# Patient Record
Sex: MALE | Race: WHITE
[De-identification: names, ages, dates, MRNs, and addresses within clinical notes are randomized per-mention and may not be internally consistent; named-entity substitution may affect disease eponyms.]

---

## 2018-06-01 ENCOUNTER — HOSPITAL ENCOUNTER (INPATIENT)
Dept: HOSPITAL 59 - MEDSURG | Age: 63
LOS: 4 days | Discharge: HOME HEALTH | DRG: 470 | End: 2018-06-05
Attending: ORTHOPAEDIC SURGERY | Admitting: ORTHOPAEDIC SURGERY
Payer: COMMERCIAL

## 2018-06-01 DIAGNOSIS — Z79.84: ICD-10-CM

## 2018-06-01 DIAGNOSIS — M10.9: ICD-10-CM

## 2018-06-01 DIAGNOSIS — E11.9: ICD-10-CM

## 2018-06-01 DIAGNOSIS — M16.11: Primary | ICD-10-CM

## 2018-06-01 DIAGNOSIS — I10: ICD-10-CM

## 2018-06-01 PROCEDURE — 85014 HEMATOCRIT: CPT

## 2018-06-01 PROCEDURE — 97530 THERAPEUTIC ACTIVITIES: CPT

## 2018-06-01 PROCEDURE — 82948 REAGENT STRIP/BLOOD GLUCOSE: CPT

## 2018-06-01 PROCEDURE — 97110 THERAPEUTIC EXERCISES: CPT

## 2018-06-01 PROCEDURE — 36416 COLLJ CAPILLARY BLOOD SPEC: CPT

## 2018-06-01 PROCEDURE — 85018 HEMOGLOBIN: CPT

## 2018-06-01 PROCEDURE — 86901 BLOOD TYPING SEROLOGIC RH(D): CPT

## 2018-06-01 PROCEDURE — 86900 BLOOD TYPING SEROLOGIC ABO: CPT

## 2018-06-01 PROCEDURE — 86850 RBC ANTIBODY SCREEN: CPT

## 2018-06-04 LAB
ABO GROUP: (no result)
ANTIBODY SCREEN: NEGATIVE
RH TYPE: NEGATIVE

## 2018-06-04 PROCEDURE — 0SR906A REPLACEMENT OF RIGHT HIP JOINT WITH OXIDIZED ZIRCONIUM ON POLYETHYLENE SYNTHETIC SUBSTITUTE, UNCEMENTED, OPEN APPROACH: ICD-10-PCS | Performed by: ORTHOPAEDIC SURGERY

## 2018-06-04 RX ADMIN — DOCUSATE SODIUM SCH: 100 TABLET, FILM COATED ORAL at 09:59

## 2018-06-04 RX ADMIN — FERROUS SULFATE TAB 325 MG (65 MG ELEMENTAL FE) SCH: 325 (65 FE) TAB at 18:32

## 2018-06-04 RX ADMIN — VANCOMYCIN HYDROCHLORIDE SCH MLS/60 MIN: 1 INJECTION, POWDER, LYOPHILIZED, FOR SOLUTION INTRAVENOUS at 22:14

## 2018-06-04 RX ADMIN — HYDROCODONE BITARTRATE AND ACETAMINOPHEN PRN EACH: 7.5; 325 TABLET ORAL at 16:43

## 2018-06-04 RX ADMIN — FERROUS SULFATE TAB 325 MG (65 MG ELEMENTAL FE) SCH MG: 325 (65 FE) TAB at 22:07

## 2018-06-04 RX ADMIN — DOCUSATE SODIUM SCH MG: 100 TABLET, FILM COATED ORAL at 22:09

## 2018-06-04 RX ADMIN — HYDROCODONE BITARTRATE AND ACETAMINOPHEN PRN EACH: 7.5; 325 TABLET ORAL at 22:13

## 2018-06-04 NOTE — REHAB EVALUATION
Patient Information





- Patient Information


Diagnosis: R hip OA.


Ordered Treatment: PT Evaluate and Treat


Status: Initial Evaluation


Surgery: Yes (THR)


Date of Surgery: 06/04/18


Past Medical/Surgical Hx: 


 PAST MEDICAL/SURGICAL HISTORY





Past Surgical History            tonsils


                                 kidney stone





PMH - Respiratory





Hx Respiratory Disorders         Yes


Hx Bronchitis                    Yes: in past


Hx Pneumonia                     Yes: 8 yrs ago possibly





PMH - Cardiovascular





Hx Cardiovascular Disorders      Yes


Hx Hypertension                  Yes: on meds good control


Exercise Tolerance               Fair


Hx Transient Ischemic Attacks    Yes: possibly 4 years ago decreased peripheral


(TIA)                            vision


Comment:                         due to hip pain





PMH - Neuro





Hx Neurological Disorders        Yes


Hx Transient Ischemic Attacks    Yes: possibly 4 years ago decreased peripheral


(TIA)                            vision





PMH - GI





Hx Gastrointestinal Disorders    No





PMH - 





Hx Genitourinary Disorders       Yes


Hx Kidney Stones                 Yes





PMH - Endocrine





Hx Endocrine Disorders           Yes


Hx Diabetes                      Yes: dx'd early 1990's


Hx Thyroid Disease               Yes


Hx of IDDM                       Yes


Comment:                          this am





PMH - Musculoskeletal





Hx Musculoskeletal Disorders     Yes


Hx Arthritis                     Yes: right hip


Hx Gout                          Yes





PMH - Psych





Hx Psychiatric Problems          No





PMH - Hematology/Oncology





Hx Hematology/Oncology           No


Disorders                        


Comment:                         nose bleed while on ASA








Premorbid Status: Detail (The patient was previously independent with all 

mobility prior to surgery.)


Social History: Detail (The patient lives alone in a 2 story house with a ramp 

at front enterance and 2 steps with one railing at the back enterance. The 

patient will be living on the first floor primarily. The patient's bathroom is 

equipped wtih a tub/shower combination with a shower bench, standard toilet 

seat with a riser seat. The patient has a walker with wheels, wheelchair, 

reacher, long handled sponge and shoe horn.)


Precautions: Universal, Fall, Other (THR precautions.)





- Time With Patient


Total Time Spent With Patient (Min): 30


Treatment Procedures: Detail (Initial Evaluation)





Subjective Information





- Subjective Information


Per Patient (The patient had no complaints of pain.)





Objective Data





- Mental Status


Patient Orientation: Oriented x3





- Visual Perception


Appears within normal limits for therapeutic activities





- ROM


Not within normal limits (The patient's R hip is within total hip precautions. 

All other LE hip AROM is WNL.)





- Strength/Tone


Not within normal limits (The patient's R LE strength was not tested secondary 

to s/p surgery, however patient's strength is functional ie: patient is able to 

lift LE out of bed. The patient's R LE strength is generally 4+ to 5/5.)





- Bed Mobility


Independent (The patient was independent with supine to sit and scooting up in 

bed correctly following THR precautions.)





- Transfers


Independent (The patient was independent with sit to and from stand transfer.)





- Balance


Balance Sitting: Good


Balance Standing: Good





- Sensation


Intact





- Gait


Detail (The patient ambulated 48 feet x 1 with wheeled walker, WBAT on the R LE 

with CG for safety due to occasional unsteadiness with ambulation.)





Therapy Assessment





- Therapy Assessment


Detail (The patient was independent with bed mobilty ,transfers and required CG 

for safety with ambulation. Feel the patient will progress well with mobility.)





Patient Education





- Patient Education


Teaching Topic: Exercise/Activity (The patient completed the following THR 

exercises: gluteal sets, ankle pumps, hip abduction supine, heel slides, quad 

sets, hamstring sets.), Precautions (The patient was able to verbalize THR 

precautions with occasional verbal reminders.)


Response: Return Demonstration


Teaching Method: Discussion, Handout


Teaching Recipient: Patient


Barriers To Learning: None





Problem List





- Problem List


Physical Therapy Problem List: Detail (1) Decreased R LE strength as to be 

expected following surgery. 2) Non ambulatory on stairs. 3) CG with ambulation 

due to unsteadiness.)





Goals





- Goals


Physical Therapy Goals: 1) The patient will be independent with ambulation with 

wheeled walker household distance WBAT on the R LE.  2) The patient will 

ambulate on stairs with supervision of safety.  3) The patient will 

consistently remember and follow THR precautions.





Prognosis





- Prognosis


Good





Plan





- Plan


Physical Therapy Plan: PT 1-2 times a day for gait training on levels and 

stairs until all inpatient goas have been met.

## 2018-06-05 LAB
HCT VFR BLD CALC: 33 % (ref 42–52)
HGB BLD-MCNC: 10.6 GM/DL (ref 14–18)

## 2018-06-05 RX ADMIN — DOCUSATE SODIUM SCH MG: 100 TABLET, FILM COATED ORAL at 10:07

## 2018-06-05 RX ADMIN — VANCOMYCIN HYDROCHLORIDE SCH MLS/60 MIN: 1 INJECTION, POWDER, LYOPHILIZED, FOR SOLUTION INTRAVENOUS at 08:30

## 2018-06-05 RX ADMIN — HYDROCODONE BITARTRATE AND ACETAMINOPHEN PRN EACH: 7.5; 325 TABLET ORAL at 07:39

## 2018-06-05 RX ADMIN — FERROUS SULFATE TAB 325 MG (65 MG ELEMENTAL FE) SCH MG: 325 (65 FE) TAB at 10:07

## 2018-06-05 NOTE — REHAB EVALUATION
Patient Information





- Patient Information


Diagnosis: R hip OA.


Ordered Treatment: OT Evaluate and Treat


Status: Initial Evaluation


Surgery: Yes (THR)


Date of Surgery: 06/04/18


Past Medical/Surgical Hx: 


 PAST MEDICAL/SURGICAL HISTORY





Past Surgical History            tonsils


                                 kidney stone





PMH - Respiratory





Hx Respiratory Disorders         Yes


Hx Bronchitis                    Yes: in past


Hx Pneumonia                     Yes: 8 yrs ago possibly





PMH - Cardiovascular





Hx Cardiovascular Disorders      Yes


Hx Hypertension                  Yes: on meds good control


Exercise Tolerance               Fair


Hx Transient Ischemic Attacks    Yes: possibly 4 years ago decreased peripheral


(TIA)                            vision


Comment:                         due to hip pain





PMH - Neuro





Hx Neurological Disorders        Yes


Hx Transient Ischemic Attacks    Yes: possibly 4 years ago decreased peripheral


(TIA)                            vision





PMH - GI





Hx Gastrointestinal Disorders    No





PMH - 





Hx Genitourinary Disorders       Yes


Hx Kidney Stones                 Yes





PMH - Endocrine





Hx Endocrine Disorders           Yes


Hx Diabetes                      Yes: dx'd early 1990's


Hx Thyroid Disease               Yes


Hx of IDDM                       Yes


Comment:                          this am





PMH - Musculoskeletal





Hx Musculoskeletal Disorders     Yes


Hx Arthritis                     Yes: right hip


Hx Gout                          Yes





PMH - Psych





Hx Psychiatric Problems          No





PMH - Hematology/Oncology





Hx Hematology/Oncology           No


Disorders                        


Comment:                         nose bleed while on ASA








Premorbid Status: Detail (The patient was previously independent with all 

mobility and I/ADLs prior to surgery.)


Social History: Detail (The patient lives alone in a 2 story house with a ramp 

at the front enterance and 2 steps with one railing at the back enterance. The 

patient will be living on the first floor primarily. The patient's bathroom is 

equipped with a tub/shower combination with a shower bench (all 4 legs in shower

), standard toilet seat with a riser seat (on the way), and grab bars 

throughout bathroom including in tub/shower. The patient has a 2WW, wheelchair, 

reacher, long handled sponge, and shoe horn.)


Precautions: Universal, Fall, Other (Total Hip precautions.)





- Time With Patient


Total Time Spent With Patient (Min): 25


Treatment Procedures: Detail (Initial Evaluation: Low Complexity, completed by 

OT student Candice Lynn under direct supervision of OTRL. Pt. was left supine 

in bed with call light, cell phone, and bedside table within reach, and 

declined needing anything at this time.)





Subjective Information





- Subjective Information


Per Patient (Pt stated he is right hand dominant, will be receiving home health 

upon returning home, and that his work involves social security for 

disabilities. Pt also reported that he "doesn't own socks".)





Objective Data





- Pain


Pain Present: Yes


Pain Scale Used: Numeric (1 - 10) (1/10 at rest in R hip)





- Mental Status


Patient Orientation: Oriented x3





- Visual Perception


Appears within normal limits for therapeutic activities





- ROM


Within normal limits (BUE AROM all planes WNL)





- Strength/Tone


Within normal limits (MMT- BUE in all planes 5/5 except L shoulder flexion 4+/5)





- Coordination


Appears within normal limits for therapeutic activities





- Bed Mobility


Independent (Supine<>SS EOB Indp. from flat surface and no use of hand rails 

but pt. Ind. initiated modified techniques to adhere to hip precautions.)





- Transfers


Independent (SS EOB<>standing with 2WW Indp. with proper hand placement and 

adherence to hip precautions.)





- Balance


Balance Sitting: Good


Balance Standing: Fair (2WW for support)





- Sensation


Intact (Light touch intact for BUE fingertips on volar surface.)





- ADL's/IADL's


Detail (Pt verbalize understanding of hip precautions without any VC. Educated 

patient in adaptive LB dressing techniques and use of AE for safety and 

independence. Pt demonstrated and verbalized understanding of adaptive LB 

dressing techniques. Pt donned shorts while safely and independently utilizing 

adaptive LB dressing techniques to adhere to hip precautions. Pt donned 

pullover t-shirt Indp. in SS EOB. Pt verbalized understanding on how to omar/

doff shoes with AE (reacher, shoe horn). Pt denied education/training in sock 

aid, as he stated he "doesn't own socks." Educated pt on wearing gripper socks 

or tennis shoes when out of bed in hospital for safety. Pt plans on sponge 

bathing until home health services is able to assist with bathing.)





Therapy Assessment





- Therapy Assessment


Detail (ADL tasks completed safely and indpendently and no longer requires 

inpatient skilled OT services at this time. Pt will be receiving home health 

care upon returning home for intial recovery. Pt has a good support system at 

home, stating "lots of people" and "2 nurses and a therapist" to assist if 

needed. Pt. has hip kit equipment and a good environmental set-up at home.)





Patient Education





- Patient Education


Teaching Topic: Equipment Use, Other (Adaptive LB dressing techniques)


Response: Return Demonstration, Verbalize Understanding


Teaching Method: Discussion, Demonstration


Teaching Recipient: Patient


Barriers To Learning: None





Problem List





- Problem List


Physical Therapy Problem List: Detail (1) Decreased R LE strength as to be 

expected following surgery. 2) Non ambulatory on stairs. 3) CG with ambulation 

due to unsteadiness.)





Goals





- Goals


Physical Therapy Goals: GOALS MET:  1) The patient will be independent with 

ambulation with wheeled walker household distance WBAT on the R LE.  2) The 

patient will ambulate on stairs with supervision of safety.  3) The patient 

will consistently remember and follow THR precautions.





Prognosis





- Prognosis


Good (Pending pt adheres to hip precautions)





Plan





- Plan


Physical Therapy Plan: The patient has met all inpatient PT goals and is 

discharged from inpatient PT. The patient is to receive Home PT.


Occupational Therapy Plan: D/c pt from skilled inpatient OT services at this 

time. Pt reported having no questions or concerns regarding his return home.

## 2018-06-05 NOTE — PHYSICAL THERAPY TX NOTE
Physical Therapy Tx Note





- Treatment Note


Tolerated: Good


Total Time Spent With Patient: 25


Physical Therapy Tx Note: Detail (The patient was in bed when PT arrived.  The 

patient ambulated with 2 wheeled walker independently WBAT on the R LE to 

bathroom and in the hallway 136 feet. The patient ambulated with supervision 

for safety on 3 stairs with one railing and folded walker. The patient 

completed THR exercises: gluteal sets, quad sets, hamstring sets, hip abduction

, heel slides, ankle pumps all x 10 reps.)


Physical Therapy Problem List: Detail (1) Decreased R LE strength as to be 

expected following surgery. 2) Non ambulatory on stairs. 3) CG with ambulation 

due to unsteadiness.)


Physical Therapy Goals: GOALS MET:  1) The patient will be independent with 

ambulation with wheeled walker household distance WBAT on the R LE.  2) The 

patient will ambulate on stairs with supervision of safety.  3) The patient 

will consistently remember and follow THR precautions.


Physical Therapy Plan: The patient has met all inpatient PT goals and is 

discharged from inpatient PT. The patient is to receive Home PT.

## 2018-06-05 NOTE — RADIOLOGY REPORT
EXAM:  RIGHT HIP



HISTORY:  POSTOP RIGHT LUCAS.  



TECHNIQUE:  AP views of the right hip were obtained.  



Comparison:  None.  



FINDINGS:  The patient is postop right LUCAS.  The components appear in good 
position in the frontal projection with no dislocation evident.  Some air is 
seen in the soft tissues which is presumably postoperative in nature.  



IMPRESSION:  

POSTOP RIGHT LUCAS.  THE COMPONENTS APPEAR IN GOOD POSITION IN THE AP PROJECTION.
  



JOB NUMBER:  [id number]
MTDD

## 2018-06-05 NOTE — OPERATIVE NOTE
DATE OF SURGERY:  06/04/2018



PREOPERATIVE DIAGNOSIS:  End-stage right hip arthrosis. 



POSTOPERATIVE DIAGNOSIS:  End-stage right hip arthrosis. 



OPERATION:  Right total hip arthroplasty. 



SURGEON:  Trip Luong M.D. 



ANESTHESIA:  Spinal. JONATHAN Morataya.



COMPLICATIONS:  None. 



BLOOD LOSS:  200 mL.



OPERATIVE FINDINGS:  Severe bone-on-bone hip arthrosis with lateralization of 
the joint. Severe deformity of the femoral head, almost autofused. 



COMPONENTS PLACED:  1 gram Vancomycin Powder and a [employer] Synergy total 
hip arthroplasty system size 15 high offset with a 32 +0 mm Oxinium femoral 
head component, a 54 mm three-hole reflection acetabular shell component with 
two screw caps, a centrally threaded screw cap, and one acetabular screw and a 
35 degree high-crosslinked polyethylene liner.



INDICATIONS FOR OPERATION: This is a 63-year-old male who has had severe hip 
arthrosis with persistent pain and dysfunction in his hip for several years. He 
failed nonoperative treatment and is scheduled for replacement. I explained the 
risks and benefits to him in detail for diagnosis and procedures including but 
not limited to; infection, nerve injury, vessel injury, persistent pain, 
persistent numbness and tingling in his hip, periprosthetic fracture, need for 
resection arthroplasty should the components become infected or loosened, nerve 
injury, vessel injury, blood clot, need for anticoagulation to prevent blood 
clots and risks associated with these medications and need for further 
procedures and all of his questions were answered. Rehab and course were 
outlined and he agreed to proceed. 



PROCEDURE: The patient was brought to the O.R. and placed in the left lateral 
decubitus position. His right hip and lower extremity were prepped and draped 
in a sterile fashion, prepped again with ChloraPrep after it was draped. 
Intraoperative time-out was performed.



Next, the posterior approach was marked over the hip. The hip was infiltrated 
with 0.5% Marcaine with Epinephrine. Skin and subcutaneous tissues were 
dissected down. Gluteal fascia was split longitudinally and the subgluteal 
plane was bluntly dissected. A self-retainer was brought in. The short external 
rotators were basically atrophied away and were absent. I incised the capsule 
superiorly and inferiorly and released it proximally and distally and 
dislocated the femoral head. It was severely deformed, massive osteophytes with 
lateral subluxation. We then resected the head about 1.5 cm above the lesser 
trochanter and then inserted the boxed osteotome. We inserted the reamers, by 
hand first and then reamed them with power to 15, which had a good fit and 
stopped there. 



We broached a 13. We had 15 degrees of anteversion and calcar planed then to 14
, then to 15 and had a good fit.  



Attention was turned to the acetabulum. We released the capsule anteriorly, 
placed an inferior acetabular retractor and another femoral retractor 
anteriorly and had good exposure. We then resected the labrum around the 
periphery and capsule. 



Next, we had to medialize significantly. It was a very lateralized hip. Using a 
44 mm reamer, we medialized to the medial teardrop wall and then, working up in 
1 mm increments in 45 degrees inclination and 20 degrees of anteversion until 
we reamed up to a size 53. We stopped there, trialed a 54 and had good fit. It 
was down to the medial wall and that's the size we used. 



Next, we changed gloves and brought in a clean sheet. We copiously irrigated 
bony surfaces and impacted down the real three-hole acetabular shell with the 
helicopter guide in 45 degrees of inclination and 20 degrees of anteversion. We 
verified it was down medially. We then drilled the posterior central superior 
quadrant screw hole, measured for a 40 mm screw and inserted that screw. It had 
a good purchase there. 



Next, we placed a trial liner. We then placed the trial femoral stem and did a 
trial reduction. The best combination for range of motion, stability, and leg 
lengths with a high offset 32 +0 mm femoral head component. This allowed for 
symmetric leg lengths. This left her good abductor tensioning, flexion to 90, 
internal rotation to 70 before the hip dislocated, and stability with extension 
in external rotation. These were the size we used.



Next, we removed all trial components, irrigated copiously again with pulsatile 
antibiotic solution and inserted the two screw caps and the centrally threaded 
screw cap and then impacted down the real 35 degree hooded liner with the edgar 
in the posterior superior quadrant.



We irrigated the femoral canal and placed some antibiotic powder in the femoral 
canal and then impacted down the real femoral stem, again in 15 degrees of 
anteversion until the bead line was flush with the medial calcar.



We cleaned and dried the trunnion, impacted down the femoral head. We re-
reduced the hip. Final range of motion and stability was the same. We irrigated 
copiously.  



Next, we placed Vancomycin Powder deep in the joint and around the periphery 
here and then injected the deep tissues and periosteum with 0.5% Marcaine with 
Epinephrine, Tranexamic Acid, and Exparel mixture. There were no short external 
rotators to close. 



Next, we closed the gluteal fascia with #2 Quill suture securely, placed some 
antibiotic here. Previously irrigated. We then closed the skin with 2-0 Vicryl 
and injected the remainder of our mixture into the subcutaneous tissues. 



Sterile dressing applied with ActiCoat. He'll have his dressing changed 
tomorrow to ELVIN dressing prior to discharge home. He will follow-up in two 
weeks, have home therapy nurse. 



The patient tolerated the procedure well. No intraoperative complications. All 
sponge, needle and blade counts were correct. Recovery stable, neurovascularly 
intact. 



cc: Dr. Nahun Torres, [facility]



JOB NUMBER:  [id number]
MTDD